# Patient Record
(demographics unavailable — no encounter records)

---

## 2025-01-13 NOTE — HISTORY OF PRESENT ILLNESS
[FreeTextEntry1] : patient presents for ER follow up from [facility].  [de-identified] : Presenting in office for follow up after being discharged from [facility] emergency with belles palsroxana   For past 4 years she has been having issues where her thyroid swells and then resolves, has not happened in 2 years, this time the thyroid swells and must have crushed the nerve behind her ear and caused belles paulsey. She had seen previous doctors and was recommended to see endocrinologist but had never gone, did not think it was serious.   neurologist - Dr Morena Perez - was told that problems with bells palsey will be resolving soon, if it is not healed in 3 months will return to her for further work up  has not followed up with endocrinology

## 2025-01-13 NOTE — PHYSICAL EXAM
[Normal] : no carotid or abdominal bruits heard, no varicosities, pedal pulses are present, no peripheral edema, no extremity clubbing or cyanosis and no palpable aorta [de-identified] : left sided facial paralysis

## 2025-01-13 NOTE — PLAN
[FreeTextEntry1] : bells palsey is improving according to patient will obtain records from neurologist she will follow up with them if does not have full improvement in 3 months  thyroid swelling reviewed thyroid scan from jan 5th with normal thyroid will obtain records from [facility] will send e-consultation for endocrinology follow up

## 2025-01-23 NOTE — CONSULT LETTER
[FreeTextEntry1] : Roxy Garces  [FreeTextEntry2] : Thyroid swelling  self reported  [FreeTextEntry3] : pt is a 27 yo female with  h/o Bell's Palsy recently treated with steroids and valcyclovir.  Pt describes intermittent thyroid swelling over the years  PMH recent Bell's palsy   PSH   C section   FH  Father DM  Paternal GM breast cancer  Soc Hx  NA   Meds  None  All NKDA  Meds  Labs    	Test  	Result  	Flag	Reference	Goal  	TSHX	1.17 uIU/mL	 	0.27-4.20  US  1/4/2025 ZP Right lobe thyroid  4.6 cm x 1.7 cm x 1.2 cm  Left iobe thyroid  4.4 cm x 1.7 cm x 1.0 cm  Isthmus 0.2cm Thyroid parenchyma is homogenous with normal size and vascularity   Right lobe cystic nodule 3 mm Left lobe cystic nodule 4mm [FreeTextEntry4] : Pt describes thyroid swelling over the years  Thyroid Sono  showed subcentimeter cysts - Can repeat Thyroid US in 1 year Last TSH was normal in August 2024    can check  repeat TSH free T4 free T4  antithyroid antibodies in early February   Follow up with Neurology for Bell's palsy  [FreeTextEntry5] : If there are any further questions, please reach out to me

## 2025-02-03 NOTE — DISCUSSION/SUMMARY
[FreeTextEntry1] : 27 yo here for well woman exam.  1) Health maintenance:  Pap  GC/CT STI testing, referral given Likely s/p Gardasil  2) Contraception:  Condoms Safe sexual practices reviewed  3) Vaginal discharge:  Affirm Will contact with results  RTO 1 yr or prn

## 2025-02-03 NOTE — HISTORY OF PRESENT ILLNESS
[FreeTextEntry1] : LMP: 1/27/25  Pt is a 29 yo  here today for well woman exam. No complaints.     Health maintenance Lpap- 2 yrs ago, normal  PObHx: C/S, ETOPx2 PGynHx: Denies cysts/fibroids/hx abnormal pap s/p colpo normal, +h/o Chlamydia (2013) treated. SA, male, monogamous. Condoms for contraception. Unsure if has had Gardasil vaccine, believes she has.   PMH: [place] Palsy, enlarged thyroid,  PSH: C/S FamHx: PGM- Breast cancer, DM- Father SocialHx:  No tob/occ ETOH/denies drug use Medications: Denies Allergies: NKDA

## 2025-02-03 NOTE — PHYSICAL EXAM
[Chaperone Present] : A chaperone was present in the examining room during all aspects of the physical examination [00780] : A chaperone was present during the pelvic exam. [FreeTextEntry2] : HERIBERTO Padron  [Appropriately responsive] : appropriately responsive [Alert] : alert [No Acute Distress] : no acute distress [Clear to Auscultation B/L] : clear to auscultation bilaterally [Soft] : soft [Non-tender] : non-tender [Non-distended] : non-distended [No HSM] : No HSM [No Lesions] : no lesions [No Mass] : no mass [Oriented x3] : oriented x3 [Examination Of The Breasts] : a normal appearance [No Masses] : no breast masses were palpable [Labia Majora] : normal [Labia Minora] : normal [Normal] : normal [Uterine Adnexae] : normal [FreeTextEntry4] : small amount think white clumpy discharge

## 2025-02-03 NOTE — HISTORY OF PRESENT ILLNESS
[FreeTextEntry1] : LMP: 1/27/25  Pt is a 29 yo  here today for well woman exam. No complaints.     Health maintenance Lpap- 2 yrs ago, normal  PObHx: C/S, ETOPx2 PGynHx: Denies cysts/fibroids/hx abnormal pap s/p colpo normal, +h/o Chlamydia (2013) treated. SA, male, monogamous. Condoms for contraception. Unsure if has had Gardasil vaccine, believes she has.   PMH: [place] Palsy, enlarged thyroid,  PSH: C/S FamHx: PGM- Breast cancer, DM- Father SocialHx:  No tob/occ ETOH/denies drug use Medications: Denies Allergies: NKDA

## 2025-02-03 NOTE — REASON FOR VISIT
[Initial] : an initial consultation for [Annual] : an annual visit. [FreeTextEntry2] : well woman exam

## 2025-02-03 NOTE — DISCUSSION/SUMMARY
[FreeTextEntry1] : 29 yo here for well woman exam.  1) Health maintenance:  Pap  GC/CT STI testing, referral given Likely s/p Gardasil  2) Contraception:  Condoms Safe sexual practices reviewed  3) Vaginal discharge:  Affirm Will contact with results  RTO 1 yr or prn

## 2025-02-03 NOTE — PHYSICAL EXAM
[Chaperone Present] : A chaperone was present in the examining room during all aspects of the physical examination [[id number]] : A chaperone was present during the pelvic exam. [FreeTextEntry2] : HERIBERTO Padron  [Appropriately responsive] : appropriately responsive [Alert] : alert [No Acute Distress] : no acute distress [Clear to Auscultation B/L] : clear to auscultation bilaterally [Soft] : soft [Non-tender] : non-tender [Non-distended] : non-distended [No HSM] : No HSM [No Lesions] : no lesions [No Mass] : no mass [Oriented x3] : oriented x3 [Examination Of The Breasts] : a normal appearance [No Masses] : no breast masses were palpable [Labia Majora] : normal [Labia Minora] : normal [Normal] : normal [Uterine Adnexae] : normal [FreeTextEntry4] : small amount think white clumpy discharge